# Patient Record
Sex: MALE | Race: WHITE | NOT HISPANIC OR LATINO | URBAN - METROPOLITAN AREA
[De-identification: names, ages, dates, MRNs, and addresses within clinical notes are randomized per-mention and may not be internally consistent; named-entity substitution may affect disease eponyms.]

---

## 2018-03-02 NOTE — PATIENT DISCUSSION
(E11.9) Type 2 diabetes mellitus without complications - Assesment : Patient has type II diabetes with no ocular complications. - Plan : Monitor. Continue to control blood sugar levels with PCP and yearly dilated exams RTC 1 year for exam or sooner for problems.

## 2018-03-02 NOTE — PATIENT DISCUSSION
(H25.13) Age-related nuclear cataract, bilateral - Assesment : Examination revealed cataract. Patient is functioning reasonably well with minor symptoms. - Plan : Monitor for changes. Advised patient to call our office with decreased vision or increased symptoms. RTC 1 year for exam or sooner for problems.

## 2020-07-08 ENCOUNTER — IMPORTED ENCOUNTER (OUTPATIENT)
Dept: URBAN - METROPOLITAN AREA CLINIC 9 | Facility: CLINIC | Age: 75
End: 2020-07-08

## 2021-07-07 ENCOUNTER — IMPORTED ENCOUNTER (OUTPATIENT)
Dept: URBAN - METROPOLITAN AREA CLINIC 9 | Facility: CLINIC | Age: 76
End: 2021-07-07

## 2021-07-07 PROBLEM — E11.9: Noted: 2021-07-07

## 2021-07-07 PROBLEM — H33.023: Status: RESOLVED | Noted: 2021-07-07

## 2021-10-16 ASSESSMENT — TONOMETRY
OS_IOP_MMHG: 15
OS_IOP_MMHG: 14
OD_IOP_MMHG: 13
OD_IOP_MMHG: 14

## 2021-10-16 ASSESSMENT — VISUAL ACUITY
OD_SC: 20/25 - SN
OS_SC: 20/25 -2 SN
OS_SC: 20/40 SN
OD_SC: 20/40 SN
OS_PH: 20/30 SN
OD_PH: 20/30 SN

## 2022-07-04 RX ORDER — ROSUVASTATIN CALCIUM 20 MG/1
TABLET, COATED ORAL
COMMUNITY

## 2022-07-04 RX ORDER — EZETIMIBE 10 MG/1
TABLET ORAL
COMMUNITY

## 2022-07-04 RX ORDER — LISINOPRIL 10 MG/1
TABLET ORAL
COMMUNITY

## 2022-07-18 ENCOUNTER — ESTABLISHED PATIENT (OUTPATIENT)
Dept: URBAN - METROPOLITAN AREA CLINIC 4 | Facility: CLINIC | Age: 77
End: 2022-07-18

## 2022-07-18 DIAGNOSIS — H33.023: ICD-10-CM

## 2022-07-18 DIAGNOSIS — E11.9: ICD-10-CM

## 2022-07-18 PROCEDURE — 92134 CPTRZ OPH DX IMG PST SGM RTA: CPT

## 2022-07-18 PROCEDURE — 92014 COMPRE OPH EXAM EST PT 1/>: CPT

## 2022-07-18 ASSESSMENT — VISUAL ACUITY
OS_SC: 20/30
OD_SC: 20/20-1
OU_SC: 20/25+2

## 2022-07-18 ASSESSMENT — TONOMETRY
OS_IOP_MMHG: 15
OD_IOP_MMHG: 15

## 2022-08-04 PROBLEM — E11.9 TYPE 2 DIABETES MELLITUS WITHOUT COMPLICATION, WITHOUT LONG-TERM CURRENT USE OF INSULIN (HCC): Status: ACTIVE | Noted: 2022-08-04

## 2022-08-04 PROBLEM — I25.10 CORONARY ARTERY DISEASE INVOLVING NATIVE CORONARY ARTERY OF NATIVE HEART WITHOUT ANGINA PECTORIS: Status: ACTIVE | Noted: 2020-07-28

## 2022-08-04 PROBLEM — I48.0 PAROXYSMAL ATRIAL FIBRILLATION (HCC): Status: ACTIVE | Noted: 2020-07-28

## 2022-08-04 PROBLEM — E11.65 TYPE 2 DIABETES MELLITUS WITH HYPERGLYCEMIA (HCC): Status: ACTIVE | Noted: 2021-03-25

## 2022-08-04 PROBLEM — E88.81 INSULIN RESISTANCE: Status: ACTIVE | Noted: 2021-03-25

## 2022-08-04 PROBLEM — E55.9 VITAMIN D DEFICIENCY: Status: ACTIVE | Noted: 2021-03-25

## 2022-08-04 PROBLEM — N40.1 BENIGN PROSTATIC HYPERPLASIA WITH LOWER URINARY TRACT SYMPTOMS: Status: ACTIVE | Noted: 2022-08-04

## 2022-08-04 PROBLEM — I10 ESSENTIAL HYPERTENSION: Status: ACTIVE | Noted: 2020-07-28

## 2022-08-04 PROBLEM — E78.00 PURE HYPERCHOLESTEROLEMIA: Status: ACTIVE | Noted: 2022-08-04

## 2022-08-04 PROBLEM — R00.1 BRADYCARDIA: Status: ACTIVE | Noted: 2022-08-04

## 2022-08-04 PROBLEM — E78.5 HYPERLIPIDEMIA: Status: ACTIVE | Noted: 2020-07-28

## 2022-12-08 NOTE — PATIENT DISCUSSION
There is increased risk of floaters, retinal tear and retinal detachment associated with high myopia.

## 2022-12-15 NOTE — PATIENT DISCUSSION
Discussed OD for distance vision and patient will use his OS naturally for his near, if he does not adapt with his vision after surgery consider OS for distance.

## 2022-12-15 NOTE — PROCEDURE NOTE: CLINICAL
PROCEDURE NOTE: LASIK OD. Anesthesia: Oral Sedative. Prep: Antibiotic Drops. Betadine. After discussing risks, benefits and alternatives, patient has elected to proceed with the procedure and an informed consent was obtained. Prior to commencing surgery, patient identification, surgical procedure, site, and side with a time out were confirmed by the physician. Several drops of topical anesthetic and one drop of povidone iodine were instilled, and the eye was flushed with balance salt solution. A non-aspirating lid speculum was placed, and the patient was positioned under the femtosecond laser. A sterile patient interface was placed on the eye. After pressure was confirmed, and suction was achieved, the eye and flap procedure were centered and laser was applied, and LASIK flap created. The suction was discontinued, the patient interface was discarded, and the lid speculum removed. The patient was then positioned under the excimer laser. Prior to the treatment, patient identification, surgical procedure, site, and side with a time out were confirmed by the physician. A drop of topical anesthetic instilled. The patient was draped using a clean technique to isolate and cover the lashes and lid margins. An aspirating lid speculum was placed. A drop of balance salt solution was instilled. The edge of the LASIK flap was located and lifted with the Silver I IntraLase flap . The corneal bed dried with a Merocel eye sponge, the pupil was centered, and laser treatment was applied. An additional drop of balance salt solution applied to corneal bed; flap was repositioned/floated into position. Excess Balance Salt Solution was removed with a Ortiz ring. The corneal flap was smoothed with wet and dry Merocel. A drop of topical antibiotic and steroid was instilled consecutively. The lid speculum removed. The patient was escorted to recovery area in stable condition and without complication. Post procedure instructions given. Be Contreras

## 2023-07-25 ENCOUNTER — ESTABLISHED PATIENT (OUTPATIENT)
Dept: URBAN - METROPOLITAN AREA CLINIC 4 | Facility: CLINIC | Age: 78
End: 2023-07-25

## 2023-07-25 DIAGNOSIS — H33.023: ICD-10-CM

## 2023-07-25 DIAGNOSIS — E11.9: ICD-10-CM

## 2023-07-25 PROCEDURE — 92134 CPTRZ OPH DX IMG PST SGM RTA: CPT

## 2023-07-25 PROCEDURE — 92014 COMPRE OPH EXAM EST PT 1/>: CPT

## 2023-07-25 ASSESSMENT — VISUAL ACUITY
OD_SC: 20/30
OS_SC: 20/40

## 2023-07-25 ASSESSMENT — TONOMETRY
OD_IOP_MMHG: 16
OS_IOP_MMHG: 14